# Patient Record
Sex: FEMALE | Race: ASIAN | Employment: FULL TIME | URBAN - METROPOLITAN AREA
[De-identification: names, ages, dates, MRNs, and addresses within clinical notes are randomized per-mention and may not be internally consistent; named-entity substitution may affect disease eponyms.]

---

## 2017-07-11 PROBLEM — O36.5990 IUGR (INTRAUTERINE GROWTH RESTRICTION) AFFECTING CARE OF MOTHER: Status: ACTIVE | Noted: 2017-07-11

## 2017-07-11 PROBLEM — Z3A.37 37 WEEKS GESTATION OF PREGNANCY: Status: ACTIVE | Noted: 2017-07-11

## 2018-03-01 ENCOUNTER — OFFICE VISIT (OUTPATIENT)
Dept: FAMILY MEDICINE CLINIC | Age: 33
End: 2018-03-01

## 2018-03-01 VITALS
TEMPERATURE: 98.6 F | RESPIRATION RATE: 16 BRPM | HEART RATE: 72 BPM | OXYGEN SATURATION: 97 % | SYSTOLIC BLOOD PRESSURE: 112 MMHG | BODY MASS INDEX: 23.19 KG/M2 | HEIGHT: 62 IN | DIASTOLIC BLOOD PRESSURE: 72 MMHG | WEIGHT: 126 LBS

## 2018-03-01 DIAGNOSIS — L02.91 ABSCESS: Primary | ICD-10-CM

## 2018-03-01 RX ORDER — ACETAMINOPHEN AND CODEINE PHOSPHATE 120; 12 MG/5ML; MG/5ML
SOLUTION ORAL
Refills: 2 | COMMUNITY
Start: 2018-02-04 | End: 2019-12-06

## 2018-03-01 NOTE — PROGRESS NOTES
Erik Dewey is a 28 y.o.  female and presents with    Chief Complaint   Patient presents with    Breast Problem     Left x5 day     Subjective:  Patient presents to establish care and for c/o skin concern on left breast and nipple area. She states she noticed yesterday that she was sore around her nipple and there was a small pustule. She was putting warm compresses to the site and this morning it popped. She states it is  and she does not know if everything is out of it. Current Outpatient Prescriptions   Medication Sig Dispense Refill    norethindrone (MICRONOR) 0.35 mg tab TK 1 T PO ONCE D FOR 28 DAYS  2    PNV No12-Iron-FA-DSS-OM-3 29 mg iron-1 mg -50 mg CPKD Take 1 Tab by mouth daily.  ibuprofen (MOTRIN) 600 mg tablet Take 1 Tab by mouth every six (6) hours as needed. Indications: Pain 30 Tab 1    oxyCODONE-acetaminophen (PERCOCET) 5-325 mg per tablet Take 1-2 Tabs by mouth every four (4) hours as needed. Max Daily Amount: 12 Tabs. Indications: Pain 30 Tab 0     No Known Allergies    Review of Systems   Constitutional: Negative for fever. Skin:        Small lesion on the left nipple, draining   All other systems reviewed and are negative. Objective:  Vitals:    03/01/18 1428   BP: 112/72   Pulse: 72   Resp: 16   Temp: 98.6 °F (37 °C)   TempSrc: Oral   SpO2: 97%   Weight: 126 lb (57.2 kg)   Height: 5' 1.5\" (1.562 m)   PainSc:   2   PainLoc: Breast     Physical Exam   Constitutional: She appears well-developed and well-nourished. Skin:   + erythema, swelling, drainage. No warmth at site. Scant drainage expressed when pressure applied     Assessment/Plan:      1. Abscess  Continue warm compresses, use bacitracin as needed, keep area clean. If breast feeding be mindful could be more painful and watch for cracked skin lanolin maybe used for cracked skin or cont bacitracin. I have discussed the diagnosis with the patient and the intended plan as seen in the above orders. The patient has received an after-visit summary and questions were answered concerning future plans. I have discussed medication side effects and warnings with the patient as well. I have reviewed the plan of care with the patient, accepted their input and they are in agreement with the treatment goals. Follow-up Disposition:  Return in about 4 weeks (around 3/29/2018) for cpe labs . More than 1/2 of this 30 minute visit was spent in counselling and coordination of care, as described above.     Gwen FISHER-BC

## 2018-03-01 NOTE — PROGRESS NOTES
Ann Langley is a 28 y.o. female (: 1985) presenting to address:breast problem x5 days    Chief Complaint   Patient presents with    Breast Problem     Left x5 day       Vitals:    18 1428   BP: 112/72   Pulse: 72   Resp: 16   Temp: 98.6 °F (37 °C)   TempSrc: Oral   SpO2: 97%   Weight: 126 lb (57.2 kg)   Height: 5' 1.5\" (1.562 m)   PainSc:   2   PainLoc: Breast       Hearing/Vision:   No exam data present    Learning Assessment:     Learning Assessment 3/1/2018   PRIMARY LEARNER Patient   HIGHEST LEVEL OF EDUCATION - PRIMARY LEARNER  4 YEARS OF COLLEGE   BARRIERS PRIMARY LEARNER NONE   CO-LEARNER CAREGIVER No   PRIMARY LANGUAGE ENGLISH   LEARNER PREFERENCE PRIMARY DEMONSTRATION   ANSWERED BY patient   RELATIONSHIP SELF     Depression Screening:     PHQ over the last two weeks 3/1/2018   Little interest or pleasure in doing things Not at all   Feeling down, depressed or hopeless Not at all   Total Score PHQ 2 0     Fall Risk Assessment:   No flowsheet data found. Abuse Screening:     Abuse Screening Questionnaire 3/1/2018   Do you ever feel afraid of your partner? N   Are you in a relationship with someone who physically or mentally threatens you? N   Is it safe for you to go home? Y     Coordination of Care Questionaire:   1. Have you been to the ER, urgent care clinic since your last visit? Hospitalized since your last visit? no    2. Have you seen or consulted any other health care providers outside of the [address] [place] [address] since your last visit? Include any pap smears or colon screening. no    Advanced Directive:   1. Do you have an Advanced Directive? no    2. Would you like information on Advanced Directives? No    Patient has already received flu vaccine.

## 2018-03-01 NOTE — MR AVS SNAPSHOT
[address] [facility] [id number] [phone] Patient: Addis Sutherland MRN: [id number] Visit Information Date & Time Provider Department Dept. Phone Encounter #  
 3/1/2018  2:15 PM Nicolás Anderson, [facility] [phone] [id number] Follow-up Instructions Return in about 4 weeks (around 3/29/2018) for cpe labs . Upcoming Health Maintenance Date Due DTaP/Tdap/Td series (1 - Tdap) 8/25/2006 PAP AKA CERVICAL CYTOLOGY 8/25/2006 Influenza Age 5 to Adult 8/1/2017 Allergies as of 3/1/2018  Review Complete On: 3/1/2018 By: Nicolás Anderson NP No Known Allergies Current Immunizations  Never Reviewed No immunizations on file. Not reviewed this visit You Were Diagnosed With   
  
 Codes Comments Abscess    -  Primary ICD-10-CM: L02.91 
ICD-9-CM: 937. 9 Vitals BP Pulse Temp Resp Height(growth percentile) Weight(growth percentile) 112/72 (BP 1 Location: Right arm, BP Patient Position: Sitting) 72 98.6 °F (37 °C) (Oral) 16 5' 1.5\" (1.562 m) 126 lb (57.2 kg) SpO2 Breastfeeding? BMI OB Status Smoking Status 97% Yes 23.42 kg/m2 Recent pregnancy Never Smoker BMI and BSA Data Body Mass Index Body Surface Area  
 23.42 kg/m 2 1.58 m 2 Your Updated Medication List  
  
   
This list is accurate as of 3/1/18  3:08 PM.  Always use your most recent med list.  
  
  
  
  
 ibuprofen 600 mg tablet Commonly known as:  MOTRIN Take 1 Tab by mouth every six (6) hours as needed. Indications: Pain  
  
 norethindrone 0.35 mg Tab Commonly known as:  Jorge & Jorge TK 1 T PO ONCE D FOR 28 DAYS  
  
 oxyCODONE-acetaminophen 5-325 mg per tablet Commonly known as:  PERCOCET Take 1-2 Tabs by mouth every four (4) hours as needed. Max Daily Amount: 12 Tabs. Indications: Pain PNV No12-Iron-FA-DSS-OM-3 29 mg iron-1 mg -50 mg Cpkd Take 1 Tab by mouth daily. Follow-up Instructions Return in about 4 weeks (around 3/29/2018) for cpe labs . Patient Instructions Skin Abscess: Care Instructions Your Care Instructions A skin abscess is a bacterial infection that forms a pocket of pus. A boil is a kind of skin abscess. The doctor may have cut an opening in the abscess so that the pus can drain out. You may have gauze in the cut so that the abscess will stay open and keep draining. You may need antibiotics. You will need to follow up with your doctor to make sure the infection has gone away. The doctor has checked you carefully, but problems can develop later. If you notice any problems or new symptoms, get medical treatment right away. Follow-up care is a key part of your treatment and safety. Be sure to make and go to all appointments, and call your doctor if you are having problems. It's also a good idea to know your test results and keep a list of the medicines you take. How can you care for yourself at home? · Apply warm and dry compresses, a heating pad set on low, or a hot water bottle 3 or 4 times a day for pain. Keep a cloth between the heat source and your skin. · If your doctor prescribed antibiotics, take them as directed. Do not stop taking them just because you feel better. You need to take the full course of antibiotics. · Take pain medicines exactly as directed. ¨ If the doctor gave you a prescription medicine for pain, take it as prescribed. ¨ If you are not taking a prescription pain medicine, ask your doctor if you can take an over-the-counter medicine. · Keep your bandage clean and dry. Change the bandage whenever it gets wet or dirty, or at least one time a day. · If the abscess was packed with gauze: 
¨ Keep follow-up appointments to have the gauze changed or removed.  If the doctor instructed you to remove the gauze, gently pull out all of the gauze when your doctor tells you to. ¨ After the gauze is removed, soak the area in warm water for 15 to 20 minutes 2 times a day, until the wound closes. When should you call for help? Call your doctor now or seek immediate medical care if: 
? · You have signs of worsening infection, such as: 
¨ Increased pain, swelling, warmth, or redness. ¨ Red streaks leading from the infected skin. ¨ Pus draining from the wound. ¨ A fever. ? Watch closely for changes in your health, and be sure to contact your doctor if: 
? · You do not get better as expected. Where can you learn more? Go to http://erik-lashae.info/. Enter M486 in the search box to learn more about \"Skin Abscess: Care Instructions. \" Current as of: October 13, 2016 Content Version: 11.4 © 8485-2362 [employer]. Care instructions adapted under license by [employer] (which disclaims liability or warranty for this information). If you have questions about a medical condition or this instruction, always ask your healthcare professional. [place] [address] any warranty or liability for your use of this information. Introducing [facility]! Dear Sheri Timmons: Thank you for requesting a Molly[employer] account. Our records indicate that you already have an active Molly[employer] account. You can access your account anytime at [email or web address] Did you know that you can access your hospital and ER discharge instructions at any time in MollyWatr? You can also review all of your test results from your hospital stay or ER visit. Additional Information If you have questions, please visit the Frequently Asked Questions section of the [employer] website at [email or web address] Remember, MollyWatr is NOT to be used for urgent needs. For medical emergencies, dial 911. Now available from your iPhone and Android! Please provide this summary of care documentation to your next provider. Your primary care clinician is listed as Carlos Early. If you have any questions after today's visit, please call [phone].

## 2018-03-01 NOTE — PATIENT INSTRUCTIONS

## 2019-01-07 ENCOUNTER — HOSPITAL ENCOUNTER (OUTPATIENT)
Dept: LAB | Age: 34
Discharge: HOME | End: 2019-01-07
Payer: SELF-PAY

## 2019-01-07 ENCOUNTER — OFFICE VISIT (OUTPATIENT)
Dept: FAMILY MEDICINE CLINIC | Age: 34
End: 2019-01-07

## 2019-01-07 VITALS
HEIGHT: 62 IN | HEART RATE: 70 BPM | DIASTOLIC BLOOD PRESSURE: 70 MMHG | TEMPERATURE: 98 F | BODY MASS INDEX: 24.11 KG/M2 | RESPIRATION RATE: 18 BRPM | OXYGEN SATURATION: 98 % | WEIGHT: 131 LBS | SYSTOLIC BLOOD PRESSURE: 92 MMHG

## 2019-01-07 DIAGNOSIS — Z00.00 ROUTINE GENERAL MEDICAL EXAMINATION AT A HEALTH CARE FACILITY: ICD-10-CM

## 2019-01-07 DIAGNOSIS — D64.9 NORMOCYTIC ANEMIA: ICD-10-CM

## 2019-01-07 DIAGNOSIS — Z23 ENCOUNTER FOR IMMUNIZATION: ICD-10-CM

## 2019-01-07 DIAGNOSIS — Z00.00 ROUTINE GENERAL MEDICAL EXAMINATION AT A HEALTH CARE FACILITY: Primary | ICD-10-CM

## 2019-01-07 PROBLEM — O36.5990 IUGR (INTRAUTERINE GROWTH RESTRICTION) AFFECTING CARE OF MOTHER: Status: RESOLVED | Noted: 2017-07-11 | Resolved: 2019-01-07

## 2019-01-07 PROBLEM — Z3A.37 37 WEEKS GESTATION OF PREGNANCY: Status: RESOLVED | Noted: 2017-07-11 | Resolved: 2019-01-07

## 2019-01-07 LAB
ERYTHROCYTE [DISTWIDTH] IN BLOOD BY AUTOMATED COUNT: 11.8 % (ref 11.6–14.5)
HCT VFR BLD AUTO: 39.9 % (ref 35–45)
HGB BLD-MCNC: 13 G/DL (ref 12–16)
MCH RBC QN AUTO: 29.3 PG (ref 24–34)
MCHC RBC AUTO-ENTMCNC: 32.6 G/DL (ref 31–37)
MCV RBC AUTO: 89.9 FL (ref 74–97)
PLATELET # BLD AUTO: 303 K/UL (ref 135–420)
PMV BLD AUTO: 10.4 FL (ref 9.2–11.8)
RBC # BLD AUTO: 4.44 M/UL (ref 4.2–5.3)
WBC # BLD AUTO: 3.4 K/UL (ref 4.6–13.2)

## 2019-01-07 PROCEDURE — 36415 COLL VENOUS BLD VENIPUNCTURE: CPT

## 2019-01-07 PROCEDURE — 80053 COMPREHEN METABOLIC PANEL: CPT

## 2019-01-07 PROCEDURE — 85027 COMPLETE CBC AUTOMATED: CPT

## 2019-01-07 PROCEDURE — 83540 ASSAY OF IRON: CPT

## 2019-01-07 PROCEDURE — 82728 ASSAY OF FERRITIN: CPT

## 2019-01-07 PROCEDURE — 80061 LIPID PANEL: CPT

## 2019-01-07 PROCEDURE — 82607 VITAMIN B-12: CPT

## 2019-01-07 NOTE — PROGRESS NOTES
Assessment/Plan: 1. Routine general medical examination at a health care facility 
- CBC W/O DIFF; Future - METABOLIC PANEL, COMPREHENSIVE; Future - LIPID PANEL; Future 2. Normocytic anemia- during pregnancy 
- CBC W/O DIFF; Future 
- IRON PROFILE; Future - FERRITIN; Future - VITAMIN B12; Future 3. Encounter for immunization 
- INFLUENZA VIRUS VAC QUAD,SPLIT,PRESV FREE SYRINGE IM 
- WI IMMUNIZ ADMIN,1 SINGLE/COMB VAC/TOXOID The plan was discussed with the patient. The patient verbalized understanding and is in agreement with the plan. All medication potential side effects were discussed with the patient. Health Maintenance:  
Health Maintenance Topic Date Due  Influenza Age 5 to Adult  08/01/2018  PAP AKA CERVICAL CYTOLOGY  08/01/2021  
 DTaP/Tdap/Td series (2 - Td) 07/01/2027 Arelis Figueroa is a 35 y.o. female and presents with Physical 
  
Subjective: 
Pt here to transfer care from [facility] Grimaldo. Here for physical.   Is exercising regularly. ROS: 
Constitutional: No recent weight change. No weakness/fatigue. No f/c. Skin: No rashes, change in nails/hair, itching HENT: No HA, dizziness. No hearing loss/tinnitus. No nasal congestion/discharge. Eyes: No change in vision, double/blurred vision or eye pain/redness. Cardiovascular: No CP/palpitations. No BOOKER/orthopnea/PND. Respiratory: No cough/sputum, dyspnea, wheezing. Gastointestinal: No dysphagia, reflux. No n/v. No constipation/diarrhea. No melena/rectal bleeding. Genitourinary: No dysuria, urinary hesitancy, nocturia, hematuria. No incontinence. Musculoskeletal: No joint pain/stiffness. No muscle pain/tenderness. Endo: No heat/cold intolerance, no polyuria/polydypsia. Heme: No h/o anemia. No easy bleeding/bruising. Allergy/Immunology: No seasonal rhinitis. Denies frequent colds, sinus/ear infections. Neurological: No seizures/numbness/weakness. No paresthesias. Psychiatric:  No depression, anxiety. The PSH, FH were reviewed. SH: Social History Tobacco Use  Smoking status: Never Smoker  Smokeless tobacco: Never Used Substance Use Topics  Alcohol use: No  
 Drug use: No  
 
 
Medications/Allergies: 
Current Outpatient Medications on File Prior to Visit Medication Sig Dispense Refill  norethindrone (MICRONOR) 0.35 mg tab TK 1 T PO ONCE D FOR 28 DAYS  2  
 PNV No12-Iron-FA-DSS-OM-3 29 mg iron-1 mg -50 mg CPKD Take 1 Tab by mouth daily. No current facility-administered medications on file prior to visit. No Known Allergies Objective: 
Visit Vitals BP 92/70 (BP 1 Location: Left arm, BP Patient Position: Sitting) Pulse 70 Temp 98 °F (36.7 °C) (Oral) Resp 18 Ht 5' 1.5\" (1.562 m) Wt 131 lb (59.4 kg) LMP 01/12/2018 SpO2 98% BMI 24.35 kg/m² Constitutional: Well developed, nourished, no distress, alert HENT: Exterior ears and tympanic membranes normal bilaterally. Supple neck. No thyromegaly or lymphadenopathy. Oropharynx clear and moist mucous membranes. Normal inferior turbinates. Septum midline. Eyes: Conjunctiva normal. PERRL. Eyelids normal.  
CV: S1, S2.  RRR. No murmurs/rubs. No thrills palpated. No carotid bruits. Intact distal pulses. No edema. No aortic bruits. Pulm: No abnormalities on inspection. Clear to auscultation bilaterally. No wheezing/rhonchi. Normal effort. GI: Soft, nontender, nondistended. Normal active bowel sounds. MS: Gait normal.  Joints without deformity/tenderness. Strength intact bilateral upper and lower ext. Normal ROM all extremities. Neuro: A/O x 3. No focal motor or sensory deficits. Speech normal.  
Skin: No lesions/rashes on inspection. Psych: Appropriate affect, judgement and insight. Short-term memory intact.

## 2019-01-07 NOTE — PROGRESS NOTES
Health Maintenance Due Topic Date Due  Influenza Age 5 to Adult  08/01/2018 Immunization/s administered 1/7/2019 by Hugo De Leon LPN with guardian's consent. Patient tolerated procedure well. No reactions noted. Seasonal flu right deltoid.

## 2019-01-07 NOTE — PATIENT INSTRUCTIONS

## 2019-01-07 NOTE — PROGRESS NOTES
Olin Carrel is a 35 y.o. female (: 1985) presenting to address: Chief Complaint Patient presents with  Physical  
 
 
Vitals:  
 19 0825 BP: 92/70 Pulse: 70 Resp: 18 Temp: 98 °F (36.7 °C) TempSrc: Oral  
SpO2: 98% Weight: 131 lb (59.4 kg) Height: 5' 1.5\" (1.562 m) PainSc:   0 - No pain LMP: 2018 Hearing/Vision:  
 
 Visual Acuity Screening Right eye Left eye Both eyes Without correction:     
With correction: 20/20 20/20-1 20/20 Learning Assessment:  
 
Learning Assessment 3/1/2018 PRIMARY LEARNER Patient HIGHEST LEVEL OF EDUCATION - PRIMARY LEARNER  4 YEARS OF COLLEGE  
BARRIERS PRIMARY LEARNER NONE  
CO-LEARNER CAREGIVER No  
PRIMARY LANGUAGE ENGLISH  
LEARNER PREFERENCE PRIMARY DEMONSTRATION  
ANSWERED BY patient RELATIONSHIP SELF Depression Screening: PHQ over the last two weeks 2019 Little interest or pleasure in doing things Not at all Feeling down, depressed, irritable, or hopeless Not at all Total Score PHQ 2 0 Fall Risk Assessment:  
 
Fall Risk Assessment, last 12 mths 2019 Able to walk? Yes Fall in past 12 months? No  
 
Abuse Screening:  
 
Abuse Screening Questionnaire 2019 Do you ever feel afraid of your partner? Venita Lower Are you in a relationship with someone who physically or mentally threatens you? Venita Lower Is it safe for you to go home? Steph Marie Coordination of Care Questionaire: 1. Have you been to the ER, urgent care clinic since your last visit? Hospitalized since your last visit? NO 
 
2. Have you seen or consulted any other health care providers outside of the [address] [place] [address] since your last visit? Include any pap smears or colon screening. YES OBGYN Advanced Directive: 1. Do you have an Advanced Directive? NO 
 
2. Would you like information on Advanced Directives?  YES

## 2019-01-08 LAB
ALBUMIN SERPL-MCNC: 3.6 G/DL (ref 3.4–5)
ALBUMIN/GLOB SERPL: 0.8 {RATIO} (ref 0.8–1.7)
ALP SERPL-CCNC: 32 U/L (ref 45–117)
ALT SERPL-CCNC: 21 U/L (ref 13–56)
ANION GAP SERPL CALC-SCNC: 10 MMOL/L (ref 3–18)
AST SERPL-CCNC: 21 U/L (ref 15–37)
BILIRUB SERPL-MCNC: 0.3 MG/DL (ref 0.2–1)
BUN SERPL-MCNC: 14 MG/DL (ref 7–18)
BUN/CREAT SERPL: 14 (ref 12–20)
CALCIUM SERPL-MCNC: 8.3 MG/DL (ref 8.5–10.1)
CHLORIDE SERPL-SCNC: 108 MMOL/L (ref 100–108)
CHOLEST SERPL-MCNC: 214 MG/DL
CO2 SERPL-SCNC: 23 MMOL/L (ref 21–32)
CREAT SERPL-MCNC: 1 MG/DL (ref 0.6–1.3)
FERRITIN SERPL-MCNC: 82 NG/ML (ref 8–388)
GLOBULIN SER CALC-MCNC: 4.6 G/DL (ref 2–4)
GLUCOSE SERPL-MCNC: 92 MG/DL (ref 74–99)
HDLC SERPL-MCNC: 76 MG/DL (ref 40–60)
HDLC SERPL: 2.8 {RATIO} (ref 0–5)
IRON SATN MFR SERPL: 33 %
IRON SERPL-MCNC: 106 UG/DL (ref 50–175)
LDLC SERPL CALC-MCNC: 113.4 MG/DL (ref 0–100)
LIPID PROFILE,FLP: ABNORMAL
POTASSIUM SERPL-SCNC: 4.4 MMOL/L (ref 3.5–5.5)
PROT SERPL-MCNC: 8.2 G/DL (ref 6.4–8.2)
SODIUM SERPL-SCNC: 141 MMOL/L (ref 136–145)
TIBC SERPL-MCNC: 317 UG/DL (ref 250–450)
TRIGL SERPL-MCNC: 123 MG/DL (ref ?–150)
VIT B12 SERPL-MCNC: 442 PG/ML (ref 211–911)
VLDLC SERPL CALC-MCNC: 24.6 MG/DL

## 2019-12-06 ENCOUNTER — OFFICE VISIT (OUTPATIENT)
Dept: FAMILY MEDICINE CLINIC | Age: 34
End: 2019-12-06

## 2019-12-06 VITALS
SYSTOLIC BLOOD PRESSURE: 116 MMHG | HEART RATE: 68 BPM | DIASTOLIC BLOOD PRESSURE: 69 MMHG | WEIGHT: 132.8 LBS | HEIGHT: 62 IN | RESPIRATION RATE: 16 BRPM | BODY MASS INDEX: 24.44 KG/M2 | OXYGEN SATURATION: 100 % | TEMPERATURE: 95.7 F

## 2019-12-06 DIAGNOSIS — Z00.00 ROUTINE GENERAL MEDICAL EXAMINATION AT A HEALTH CARE FACILITY: ICD-10-CM

## 2019-12-06 DIAGNOSIS — R10.9 ABDOMINAL PAIN, UNSPECIFIED ABDOMINAL LOCATION: Primary | ICD-10-CM

## 2019-12-06 DIAGNOSIS — Z23 ENCOUNTER FOR IMMUNIZATION: ICD-10-CM

## 2019-12-06 LAB
BILIRUB UR QL STRIP: NEGATIVE
GLUCOSE UR-MCNC: NEGATIVE MG/DL
HCG URINE, QL. (POC): NEGATIVE
KETONES P FAST UR STRIP-MCNC: NEGATIVE MG/DL
PH UR STRIP: 5.5 [PH] (ref 4.6–8)
PROT UR QL STRIP: NEGATIVE
SP GR UR STRIP: 1.01 (ref 1–1.03)
UA UROBILINOGEN AMB POC: NORMAL (ref 0.2–1)
URINALYSIS CLARITY POC: CLEAR
URINALYSIS COLOR POC: YELLOW
URINE BLOOD POC: NEGATIVE
URINE LEUKOCYTES POC: NORMAL
URINE NITRITES POC: NEGATIVE
VALID INTERNAL CONTROL?: YES

## 2019-12-06 RX ORDER — NORETHINDRONE ACETATE AND ETHINYL ESTRADIOL AND FERROUS FUMARATE 1MG-20(21)
1 KIT ORAL DAILY
Refills: 11 | COMMUNITY
Start: 2019-11-18

## 2019-12-06 NOTE — PROGRESS NOTES
Perry Conception is a 29 y.o. female (: 1985) presenting to address:    Chief Complaint   Patient presents with    Abdominal Pain     x 2.5 weeks comes & goes mainly at night      Flu Immunization/s administered 2019 by Devi Pollack LPN with patients consent. Patient tolerated procedure well. No reactions noted. Vitals:    19 0912   Resp: 16   Weight: 132 lb 12.8 oz (60.2 kg)   Height: 5' 1.5\" (1.562 m)   PainSc:   0 - No pain   LMP: 2019       Hearing/Vision:   No exam data present    Learning Assessment:     Learning Assessment 3/1/2018   PRIMARY LEARNER Patient   HIGHEST LEVEL OF EDUCATION - PRIMARY LEARNER  4 YEARS OF COLLEGE   BARRIERS PRIMARY LEARNER NONE   CO-LEARNER CAREGIVER No   PRIMARY LANGUAGE ENGLISH   LEARNER PREFERENCE PRIMARY DEMONSTRATION   ANSWERED BY patient   RELATIONSHIP SELF     Depression Screening:     3 most recent PHQ Screens 2019   Little interest or pleasure in doing things Not at all   Feeling down, depressed, irritable, or hopeless Not at all   Total Score PHQ 2 0     Fall Risk Assessment:     Fall Risk Assessment, last 12 mths 2019   Able to walk? Yes   Fall in past 12 months? No     Abuse Screening:     Abuse Screening Questionnaire 2019   Do you ever feel afraid of your partner? N   Are you in a relationship with someone who physically or mentally threatens you? N   Is it safe for you to go home? Y     Coordination of Care Questionaire:   1. Have you been to the ER, urgent care clinic since your last visit? Hospitalized since your last visit? NO    2. Have you seen or consulted any other health care providers outside of the [address] [place] [address] since your last visit? Include any pap smears or colon screening. NO    Advanced Directive:   1. Do you have an Advanced Directive? NO    2. Would you like information on Advanced Directives?  NO

## 2019-12-06 NOTE — PROGRESS NOTES
[address] [facility]  Primary Care Office Visit - Acute Care    : 1985   Isaac Romero is a 29 y.o. female presenting for  Chief Complaint   Patient presents with    Abdominal Pain     x 2.5 weeks comes & goes mainly at night    PCP: Ronnie Ellison MD         Assessment/Plan:     1. Abdominal pain, unspecified abdominal location  Initial encounter. Subacute in nature. Likely 2/2 relative constipation. Check KUB for burden. Will also check labs. Given instructions re: home colon cleanse. If not improved, Future considerations: RUQ ultrasound  - AMB POC URINALYSIS DIP STICK AUTO W/O MICRO  - AMB POC URINE PREGNANCY TEST, VISUAL COLOR COMPARISON  - XR ABD (KUB); Future  - CBC WITH AUTOMATED DIFF; Future  - METABOLIC PANEL, COMPREHENSIVE; Future    2. Routine general medical examination at a health care facility  - HEMOGLOBIN A1C WITH EAG; Future  - CBC WITH AUTOMATED DIFF; Future  - METABOLIC PANEL, COMPREHENSIVE; Future  - T4, FREE; Future  - LIPID PANEL; Future  - TSH 3RD GENERATION; Future    3. Encounter for immunization  - INFLUENZA VIRUS VAC QUAD,SPLIT,PRESV FREE SYRINGE IM  - NE IMMUNIZ ADMIN,1 SINGLE/COMB VAC/TOXOID      Orders & Diagnoses associated with This Encounter:         ICD-10-CM ICD-9-CM   1. Abdominal pain, unspecified abdominal location R10.9 789.00   2. Encounter for immunization Z23 V03.89   3. Routine general medical examination at a health care facility Z00.00 V70.0       Orders Placed This Encounter    XR ABD (KUB)     Standing Status:   Future     Number of Occurrences:   1     Standing Expiration Date:   2020     Order Specific Question:   Reason for Exam     Answer:   colicky abdominal pain x 2.5 wks; assess stool burden     Order Specific Question:   Is Patient Pregnant? Answer:   No     Order Specific Question:   Which facility to perform procedure?      Answer:   [facility]    Influenza virus vaccine (QUADRIVALENT PRES FREE SYRINGE) IM ([id number])    HEMOGLOBIN A1C WITH EAG     Standing Status:   Future     Standing Expiration Date:   12/6/2020    CBC WITH AUTOMATED DIFF     Standing Status:   Future     Standing Expiration Date:   45/4/0525    METABOLIC PANEL, COMPREHENSIVE     Standing Status:   Future     Standing Expiration Date:   12/6/2020    T4, FREE     Standing Status:   Future     Standing Expiration Date:   12/6/2020    LIPID PANEL     Standing Status:   Future     Standing Expiration Date:   12/6/2020    TSH 3RD GENERATION     Standing Status:   Future     Standing Expiration Date:   12/6/2020    CBC WITH AUTOMATED DIFF    METABOLIC PANEL, COMPREHENSIVE    LIPID PANEL    HEMOGLOBIN A1C WITH EAG    T4, FREE    TSH 3RD GENERATION    CVD REPORT    AMB POC URINALYSIS DIP STICK AUTO W/O MICRO    AMB POC URINE PREGNANCY TEST, VISUAL COLOR COMPARISON    NV IMMUNIZ ADMIN,1 SINGLE/COMB VAC/TOXOID    JUNEL FE 1/20, 28, 1 mg-20 mcg (21)/75 mg (7) tab     Sig: Take 1 Tab by mouth daily. Refill:  11     Spent 25min face-to-face, >50% spent on counseling & patient education. This document may have been created with the aid of dictation software. Text may contain errors, particularly phonetic errors. Reviewed management plan & instructions with patient, who voiced understanding. Jon Jarrell MD  Internal Medicine, Family Medicine & Sports Medicine  12/6/2019    Subjective   History:   Perry Conception is a 29 y.o. female presenting to address:  Chief Complaint   Patient presents with    Abdominal Pain     x 2.5 weeks comes & goes mainly at night        New issue. \"feels like a stomach flu cramp\"  Has gotten a bit better, but then it came back. Pain was enough that affect ability to sleep. Stool is generally type 3, when symptomatic type 6-7. Generally frequency once every 1-2 days. No nausea/vomiting. No fever. No significant dietary changes. Admits to increased stress in the last month.     Tried thus far: jose pills, supplements, pepto, gas-x      History reviewed. No pertinent past medical history. Past Surgical History:   Procedure Laterality Date    HX  SECTION        reports that she has never smoked. She has never used smokeless tobacco. She reports that she does not drink alcohol or use drugs. Social History     Patient does not qualify to have social determinant information on file (likely too young). Social History Narrative    Not on file     Social History     Tobacco Use   Smoking Status Never Smoker   Smokeless Tobacco Never Used     Family History   Problem Relation Age of Onset    Hypertension Mother     Hypertension Father     Diabetes Father     No Known Problems Sister     No Known Problems Maternal Grandmother     Diabetes Maternal Grandfather     Hypertension Paternal Grandfather     Hypertension Sister      No Known Allergies    Problem List:    There are no active problems to display for this patient. Medications:     Current Outpatient Medications   Medication Sig    JUNEL FE , 28, 1 mg-20 mcg (21)/75 mg (7) tab Take 1 Tab by mouth daily.  PNV No12-Iron-FA-DSS-OM-3 29 mg iron-1 mg -50 mg CPKD Take 1 Tab by mouth daily. No current facility-administered medications for this visit. Review of Systems:     Review of Systems   Constitutional: Negative for chills and fever. HENT: Positive for congestion (started reently). Negative for ear pain and sore throat. Respiratory: Negative for cough and shortness of breath. Cardiovascular: Negative for chest pain and palpitations. Gastrointestinal: Positive for abdominal pain and diarrhea (slightly looser stools that have since resolved). Negative for constipation, heartburn, nausea and vomiting. Genitourinary: Negative for dysuria, flank pain, frequency, hematuria and urgency. Musculoskeletal: Negative for myalgias. Skin: Negative for rash.    Neurological: Negative for speech change, focal weakness and headaches. Endo/Heme/Allergies: Does not bruise/bleed easily. Psychiatric/Behavioral: Negative for depression. The patient is not nervous/anxious and does not have insomnia. Objective   Physical Assessment:   VS:    Vitals:    12/06/19 0912   BP: 116/69   Pulse: 68   Resp: 16   Temp: 95.7 °F (35.4 °C)   TempSrc: Oral   SpO2: 100%   Weight: 132 lb 12.8 oz (60.2 kg)   Height: 5' 1.5\" (1.562 m)   PainSc:   0 - No pain   LMP: 11/18/2019     Physical Exam  Nursing note reviewed. Constitutional:       Appearance: She is well-developed. She is not diaphoretic. HENT:      Head: Normocephalic and atraumatic. Neck:      Musculoskeletal: Neck supple. Thyroid: No thyromegaly. Cardiovascular:      Rate and Rhythm: Normal rate and regular rhythm. Heart sounds: No murmur. Pulmonary:      Effort: Pulmonary effort is normal.      Breath sounds: Normal breath sounds. No wheezing or rales. Abdominal:      General: Abdomen is flat. Bowel sounds are normal. There is no distension. Palpations: Abdomen is soft. There is no splenomegaly. Tenderness: There is generalized tenderness (mild). There is no right CVA tenderness, left CVA tenderness, guarding or rebound. Negative signs include McBurney's sign, psoas sign and obturator sign. Musculoskeletal: Normal range of motion. Skin:     General: Skin is warm and dry. Neurological:      Mental Status: She is alert and oriented to person, place, and time. Cranial Nerves: No cranial nerve deficit. Coordination: Coordination normal.   Psychiatric:         Behavior: Behavior normal.         Thought Content: Thought content normal.         Judgment: Judgment normal.         Recent Labs & Imaging:     Results for [employer] (MRN [id number])    Ref.  Range 12/6/2019 09:30 12/6/2019 09:31   HCG urine, Ql. (POC) Latest Ref Range: Negative   Negative   Color (UA POC) Unknown Yellow    Clarity (UA POC) Unknown Clear Specific gravity (UA POC) Latest Ref Range: 1.001 - 1.035  1.015    pH (UA POC) Latest Ref Range: 4.6 - 8.0  5.5    Protein (UA POC) Latest Ref Range: Negative  Negative    Glucose (UA POC) Latest Ref Range: Negative  Negative    Ketones (UA POC) Latest Ref Range: Negative  Negative    Blood (UA POC) Latest Ref Range: Negative  Negative    Bilirubin (UA POC) Latest Ref Range: Negative  Negative    Urobilinogen (UA POC) Latest Ref Range: 0.2 - 1  0.2 mg/dL    Nitrites (UA POC) Latest Ref Range: Negative  Negative    Leukocyte esterase (UA POC) Latest Ref Range: Negative  Trace

## 2019-12-06 NOTE — PATIENT INSTRUCTIONS
To Do: - on your way out  · X-ray  · labs  - follow the instructions to decrease your stool burden at home      Notes from your doctor:  - if pain is not significantly improved after your \"homework\" and/or your labs are abnormal, then we could consider doing some imaging like a gall bladder / liver ultrasound      TESTING RESULTS   Results will be released to [facility]. Normal results will be sent via mail. If you have questions about your results, please schedule a follow up appointment to discuss with your PCP. MEDICATION REFILLS    Please allow at least 2 business days for refill requests to be addressed. Medication refills may be requested through your pharmacy. Refills will not be provided by the after hours/on call provider. To Do:  · Buy the necessary supplies over-the-counter to do the colon cleanse below  · Then after you are \"cleaned out\". .. Start taking docusate over-the-counter daily as needed to make sure you have a bowel movement every day       Colon Cleanse:     What supplies do I need to do a colon prep?     - Two (2) Dulcolax laxative tablets - each tablet contains 5 mg of bisacodyl (do get Dulcolax stool softener). - One (1) 8.3 oz. bottle Miralax (238 grams). - 64 oz. clear liquid (NOT red): Gatorade, G2, Gatorade Ice, Powerade, or Powerade Zero.     1. In the morning, take 2 Dulcolax tablets. 2. Mix 64 oz. liquid with 8.3 oz. Miralax. 3. After 1-2 hours after your dulcolax tablets, drink one 8 oz. glass of the Miralax/Gatorade solution and continue drinking one 8 oz. glass every 15 minutes thereafter. Set a timer for every 15 minutes to keep pace.

## 2019-12-07 LAB
ALBUMIN SERPL-MCNC: 4.1 G/DL (ref 3.5–5.5)
ALBUMIN/GLOB SERPL: 1.2 {RATIO} (ref 1.2–2.2)
ALP SERPL-CCNC: 39 IU/L (ref 39–117)
ALT SERPL-CCNC: 10 IU/L (ref 0–32)
AST SERPL-CCNC: 14 IU/L (ref 0–40)
BASOPHILS # BLD AUTO: 0 X10E3/UL (ref 0–0.2)
BASOPHILS NFR BLD AUTO: 0 %
BILIRUB SERPL-MCNC: 0.2 MG/DL (ref 0–1.2)
BUN SERPL-MCNC: 11 MG/DL (ref 6–20)
BUN/CREAT SERPL: 11 (ref 9–23)
CALCIUM SERPL-MCNC: 9.4 MG/DL (ref 8.7–10.2)
CHLORIDE SERPL-SCNC: 100 MMOL/L (ref 96–106)
CHOLEST SERPL-MCNC: 196 MG/DL (ref 100–199)
CO2 SERPL-SCNC: 24 MMOL/L (ref 20–29)
CREAT SERPL-MCNC: 0.97 MG/DL (ref 0.57–1)
EOSINOPHIL # BLD AUTO: 0.3 X10E3/UL (ref 0–0.4)
EOSINOPHIL NFR BLD AUTO: 3 %
ERYTHROCYTE [DISTWIDTH] IN BLOOD BY AUTOMATED COUNT: 11.5 % (ref 12.3–15.4)
EST. AVERAGE GLUCOSE BLD GHB EST-MCNC: 100 MG/DL
GLOBULIN SER CALC-MCNC: 3.4 G/DL (ref 1.5–4.5)
GLUCOSE SERPL-MCNC: 88 MG/DL (ref 65–99)
HBA1C MFR BLD: 5.1 % (ref 4.8–5.6)
HCT VFR BLD AUTO: 39.3 % (ref 34–46.6)
HDLC SERPL-MCNC: 64 MG/DL
HGB BLD-MCNC: 13.1 G/DL (ref 11.1–15.9)
IMM GRANULOCYTES # BLD AUTO: 0 X10E3/UL (ref 0–0.1)
IMM GRANULOCYTES NFR BLD AUTO: 0 %
INTERPRETATION, 910389: NORMAL
LDLC SERPL CALC-MCNC: 101 MG/DL (ref 0–99)
LYMPHOCYTES # BLD AUTO: 1.2 X10E3/UL (ref 0.7–3.1)
LYMPHOCYTES NFR BLD AUTO: 13 %
MCH RBC QN AUTO: 29.8 PG (ref 26.6–33)
MCHC RBC AUTO-ENTMCNC: 33.3 G/DL (ref 31.5–35.7)
MCV RBC AUTO: 90 FL (ref 79–97)
MONOCYTES # BLD AUTO: 0.6 X10E3/UL (ref 0.1–0.9)
MONOCYTES NFR BLD AUTO: 6 %
NEUTROPHILS # BLD AUTO: 7 X10E3/UL (ref 1.4–7)
NEUTROPHILS NFR BLD AUTO: 78 %
PLATELET # BLD AUTO: 283 X10E3/UL (ref 150–450)
POTASSIUM SERPL-SCNC: 3.9 MMOL/L (ref 3.5–5.2)
PROT SERPL-MCNC: 7.5 G/DL (ref 6–8.5)
RBC # BLD AUTO: 4.39 X10E6/UL (ref 3.77–5.28)
SODIUM SERPL-SCNC: 137 MMOL/L (ref 134–144)
T4 FREE SERPL-MCNC: 1.12 NG/DL (ref 0.82–1.77)
TRIGL SERPL-MCNC: 153 MG/DL (ref 0–149)
TSH SERPL DL<=0.005 MIU/L-ACNC: 2.97 UIU/ML (ref 0.45–4.5)
VLDLC SERPL CALC-MCNC: 31 MG/DL (ref 5–40)
WBC # BLD AUTO: 9.1 X10E3/UL (ref 3.4–10.8)